# Patient Record
(demographics unavailable — no encounter records)

---

## 2025-02-25 NOTE — PLAN
[With new medications prescribed] : Treat in place: with new medications prescribed [FreeTextEntry1] : -rx Valtrex (reports 3 day dosing usually doesn't work, requesting 7 day dosing) -f/u with PMD or urology -Red flag symptoms for immediate ER evaluation discussed. pt expresses full understanding and agrees.

## 2025-02-25 NOTE — HISTORY OF PRESENT ILLNESS
[Home] : at home, [unfilled] , at the time of the visit. [Other Location: e.g. Home (Enter Location, City,State)___] : at [unfilled] [Telehealth (audio & video)] : This visit was provided via telehealth using real-time 2-way audio visual technology. [Verbal consent obtained from patient] : the patient, [unfilled] [FreeTextEntry8] : 45 yo male for evaluation of genital herpes since yesterday. Reports this feels exactly his previous herpes eruptions with no associate symptoms. Denies fevers, chills, discharge, testicular pain or swelling, pelvic pain.  PMH: none Allergies: none

## 2025-02-25 NOTE — ASSESSMENT
[FreeTextEntry1] : genital herpes, recurrent, withut fevers, chills, penile discharge, testicular pain or swelling, pelvic pain